# Patient Record
Sex: FEMALE | Race: WHITE
[De-identification: names, ages, dates, MRNs, and addresses within clinical notes are randomized per-mention and may not be internally consistent; named-entity substitution may affect disease eponyms.]

---

## 2017-06-04 ENCOUNTER — HOSPITAL ENCOUNTER (EMERGENCY)
Dept: HOSPITAL 10 - E/R | Age: 37
LOS: 1 days | Discharge: HOME | End: 2017-06-05
Payer: COMMERCIAL

## 2017-06-04 VITALS
HEIGHT: 61 IN | WEIGHT: 194.01 LBS | HEIGHT: 61 IN | BODY MASS INDEX: 36.63 KG/M2 | WEIGHT: 194.01 LBS | BODY MASS INDEX: 36.63 KG/M2

## 2017-06-04 DIAGNOSIS — I10: ICD-10-CM

## 2017-06-04 DIAGNOSIS — N93.9: Primary | ICD-10-CM

## 2017-06-04 DIAGNOSIS — R10.2: ICD-10-CM

## 2017-06-04 LAB
ADD SCAN DIFF: NO
ANION GAP SERPL CALC-SCNC: 14 MMOL/L (ref 8–16)
APTT BLD: 29.2 SEC (ref 25–35)
BASOPHILS # BLD AUTO: 0 10^3/UL (ref 0–0.1)
BASOPHILS NFR BLD: 0.2 % (ref 0–2)
BUN SERPL-MCNC: 12 MG/DL (ref 7–20)
CALCIUM SERPL-MCNC: 8.8 MG/DL (ref 8.4–10.2)
CHLORIDE SERPL-SCNC: 107 MMOL/L (ref 97–110)
CO2 SERPL-SCNC: 25 MMOL/L (ref 21–31)
CREAT SERPL-MCNC: 0.65 MG/DL (ref 0.44–1)
EOSINOPHIL # BLD: 0.1 10^3/UL (ref 0–0.5)
EOSINOPHIL NFR BLD: 1.4 % (ref 0–7)
ERYTHROCYTE [DISTWIDTH] IN BLOOD BY AUTOMATED COUNT: 14 % (ref 11.5–14.5)
GLUCOSE SERPL-MCNC: 153 MG/DL (ref 70–220)
HCT VFR BLD CALC: 31.4 % (ref 37–47)
HGB BLD-MCNC: 10.1 G/DL (ref 12–16)
INR PPP: 0.99
LYMPHOCYTES # BLD AUTO: 2.6 10^3/UL (ref 0.8–2.9)
LYMPHOCYTES NFR BLD AUTO: 26.3 % (ref 15–51)
MCH RBC QN AUTO: 25.8 PG (ref 29–33)
MCHC RBC AUTO-ENTMCNC: 32.2 G/DL (ref 32–37)
MCV RBC AUTO: 80.1 FL (ref 82–101)
MONOCYTES # BLD: 0.5 10^3/UL (ref 0.3–0.9)
MONOCYTES NFR BLD: 4.8 % (ref 0–11)
NEUTROPHILS # BLD: 6.7 10^3/UL (ref 1.6–7.5)
NEUTROPHILS NFR BLD AUTO: 66.9 % (ref 39–77)
NRBC # BLD MANUAL: 0 10^3/UL (ref 0–0)
NRBC BLD QL: 0 /100WBC (ref 0–0)
PLATELET # BLD: 318 10^3/UL (ref 140–415)
PMV BLD AUTO: 9.3 FL (ref 7.4–10.4)
POTASSIUM SERPL-SCNC: 4.2 MMOL/L (ref 3.5–5.1)
PROTHROMBIN TIME: 13.1 SEC (ref 12.2–14.2)
PT RATIO: 1
RBC # BLD AUTO: 3.92 10^6/UL (ref 4.2–5.4)
SODIUM SERPL-SCNC: 142 MMOL/L (ref 135–144)
WBC # BLD AUTO: 10 10^3/UL (ref 4.8–10.8)

## 2017-06-04 PROCEDURE — 84703 CHORIONIC GONADOTROPIN ASSAY: CPT

## 2017-06-04 PROCEDURE — 36415 COLL VENOUS BLD VENIPUNCTURE: CPT

## 2017-06-04 PROCEDURE — 80048 BASIC METABOLIC PNL TOTAL CA: CPT

## 2017-06-04 PROCEDURE — 85610 PROTHROMBIN TIME: CPT

## 2017-06-04 PROCEDURE — 85730 THROMBOPLASTIN TIME PARTIAL: CPT

## 2017-06-04 PROCEDURE — 85025 COMPLETE CBC W/AUTO DIFF WBC: CPT

## 2017-06-04 PROCEDURE — 76856 US EXAM PELVIC COMPLETE: CPT

## 2017-06-04 PROCEDURE — 76830 TRANSVAGINAL US NON-OB: CPT

## 2017-06-05 VITALS
DIASTOLIC BLOOD PRESSURE: 97 MMHG | SYSTOLIC BLOOD PRESSURE: 149 MMHG | HEART RATE: 93 BPM | TEMPERATURE: 97.9 F | RESPIRATION RATE: 20 BRPM

## 2017-06-05 NOTE — RADRPT
PROCEDURE:   US Pelvis. 

 

CLINICAL INDICATION:   Vaginal bleeding

 

TECHNIQUE:   Multiple sonographic images of the pelvis were obtained utilizing a transabdominal and 
endovaginal technique.   The images were reviewed on a PACS workstation. 

 

COMPARISON:   None available 

 

FINDINGS:

 

Uterus: Normal in size, contour and echogenicity with no evidence for myometrial masses. Size is est
imated at 7 x 5.9 x 4.4 cm.

 

Cervix:  No abnormalities of significance are seen.

 

Endometrium:  Increased in thickness; 15.6 mm. Normal blood flow on Doppler interrogation, the findi
ngs likely related to the patients phase of menstruation

 

Right ovary / adnexa:  Normal in size estimated at 3.1 x 2.4 x 2.2 cm.  No evidence for masses, norm
al blood flow on Doppler interrogation.

 

Left ovary/adnexa: Normal in size estimated at 4.6 x 3.8 x 3.4 cm.  No evidence for solid masses, no
rmal blood flow on Doppler interrogation. Simple anechoic cyst is present estimated at 3.4 x 3.1 x 2
.6 cm

 

Cul-de-sac:  No evidence of free fluid.  

 

 

RPTAT:HJJR

 

IMPRESSION:

 

1.  Endometrial thickness of 15.6 mm likely is related to the patients phase of menstruation.  No en
dometrial or myometrial mass identified.

2.  Simple anechoic left ovarian cyst of 3.4 cm.

_____________________________________________ 

Physician Alea           Date    Time 

Electronically viewed and signed by Physician Alea on 06/05/2017 00:41 

 

D:  06/05/2017 00:41  T:  06/05/2017 00:41

/

## 2017-06-05 NOTE — ERA
ER Documentation


Chief Complaint


Date/Time


DATE: 17 


TIME: 00:24


Chief Complaint


VAG BLEED X 28 DAYS WITH VERY HEAVY BLEEDING X 2-3 DAYS





HPI


This is a 37-year-old female heavy vaginal bleeding for 28 days.  She is a 

vitamin for the past 2-3 days.  No nausea no vomiting fevers or chills.  No 

other current complaints.  Patient has not seen her OB.





ROS


All systems reviewed and are negative except as per history of present illness.





Medications


Home Meds


Active Scripts


Ibuprofen* (Ibuprofen*) 600 Mg Tablet, 600 MG PO TID for PAIN AND/OR 

INFLAMMATION, #30 TAB


   Prov:JOSUÉ LLANOS MD         16


Prednisone* (Prednisone*) 20 Mg Tab, 20 MG PO BID for 3 Days, TAB


   Prov:JOSUÉ LLANOS MD         16





Allergies


Allergies:  


Coded Allergies:  


     No Known Allergy (Unverified , 16)





PMhx/Soc


History of Surgery:  Yes (RT OVARIAN CYST, APPY, TONSILLECTOMY )


Anesthesia Reaction:  No


Hx Neurological Disorder:  No


Hx Respiratory Disorders:  No


Hx Cardiac Disorders:  Yes (HTN )


Hx Psychiatric Problems:  No


Hx Miscellaneous Medical Probl:  No


Hx Alcohol Use:  No


Hx Substance Use:  No


Hx Tobacco Use:  No


Smoking Status:  Never smoker





Physical Exam


Vitals





Vital Signs








  Date Time  Temp Pulse Resp B/P Pulse Ox O2 Delivery O2 Flow Rate FiO2


 


17 23:29  95 19 149/97 100 Room Air  


 


17 21:54 98.0 79 18 174/102 100 Room Air  


 


17 20:59 97.3 102 18 182/106 98   








Physical Exam


Const:  []


Head:   Atraumatic 


Eyes:    Normal Conjunctiva


ENT:    Normal External Ears, Nose and Mouth.


Neck:               Full range of motion..~ No meningismus.


Resp:    Clear to auscultation bilaterally


Cardio:    Regular rate and rhythm, no murmurs


Abd:    Soft, non tender, non distended. Normal bowel sounds


Skin:    No petechiae or rashes


Back:    No midline or flank tenderness


Ext:    No cyanosis, or edema


Neur:    Awake and alert


Psych:    Normal Mood and Affect


Result Diagram:  


17





Results 24 hrs





 Laboratory Tests








Test


  17


21:48


 


White Blood Count 10.010^3/ul 


 


Red Blood Count 3.9210^6/ul 


 


Hemoglobin 10.1g/dl 


 


Hematocrit 31.4% 


 


Mean Corpuscular Volume 80.1fl 


 


Mean Corpuscular Hemoglobin 25.8pg 


 


Mean Corpuscular Hemoglobin


Concent 32.2g/dl 


 


 


Red Cell Distribution Width 14.0% 


 


Platelet Count 23851^3/UL 


 


Mean Platelet Volume 9.3fl 


 


Neutrophils % 66.9% 


 


Lymphocytes % 26.3% 


 


Monocytes % 4.8% 


 


Eosinophils % 1.4% 


 


Basophils % 0.2% 


 


Nucleated Red Blood Cells % 0.0/100WBC 


 


Neutrophils # 6.710^3/ul 


 


Lymphocytes # 2.610^3/ul 


 


Monocytes # 0.510^3/ul 


 


Eosinophils # 0.110^3/ul 


 


Basophils # 0.010^3/ul 


 


Nucleated Red Blood Cells # 0.010^3/ul 


 


Prothrombin Time 13.1Sec 


 


Prothrombin Time Ratio 1.0 


 


INR International Normalized


Ratio 0.99 


 


 


Activated Partial


Thromboplast Time 29.2Sec 


 


 


Sodium Level 142mmol/L 


 


Potassium Level 4.2mmol/L 


 


Chloride Level 107mmol/L 


 


Carbon Dioxide Level 25mmol/L 


 


Anion Gap 14 


 


Blood Urea Nitrogen 12mg/dl 


 


Creatinine 0.65mg/dl 


 


Glucose Level 153mg/dl 


 


Calcium Level 8.8mg/dl 


 


Serum HCG, Qualitative NEGATIVE 











Procedures/MDM


Ultrasound shows thickened endometrium.





Medical decision-m[age]-year-old female with vaginal bleeding.  Patient was 

placed on OCPs and told to follow-up with OB/GYN.  No evidence of anemia at 

this time.  Patient to return for worsening symptoms.





Departure


Diagnosis:  


 Primary Impression:  


 Vaginal bleeding


Condition:  Stable











SCOTT BRADY 2017 00:24

## 2018-08-13 ENCOUNTER — HOSPITAL ENCOUNTER (EMERGENCY)
Dept: HOSPITAL 91 - E/R | Age: 38
Discharge: HOME | End: 2018-08-13
Payer: COMMERCIAL

## 2018-08-13 ENCOUNTER — HOSPITAL ENCOUNTER (EMERGENCY)
Age: 38
Discharge: HOME | End: 2018-08-13

## 2018-08-13 DIAGNOSIS — R40.2142: ICD-10-CM

## 2018-08-13 DIAGNOSIS — I10: Primary | ICD-10-CM

## 2018-08-13 DIAGNOSIS — R40.2252: ICD-10-CM

## 2018-08-13 DIAGNOSIS — R40.2362: ICD-10-CM

## 2018-08-13 PROCEDURE — 99283 EMERGENCY DEPT VISIT LOW MDM: CPT

## 2018-08-28 ENCOUNTER — HOSPITAL ENCOUNTER (EMERGENCY)
Age: 38
Discharge: HOME | End: 2018-08-28

## 2018-08-28 ENCOUNTER — HOSPITAL ENCOUNTER (EMERGENCY)
Dept: HOSPITAL 91 - FTE | Age: 38
Discharge: HOME | End: 2018-08-28
Payer: COMMERCIAL

## 2018-08-28 DIAGNOSIS — I10: ICD-10-CM

## 2018-08-28 DIAGNOSIS — L08.9: ICD-10-CM
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
## 2018-08-28 DIAGNOSIS — S40.862A: Primary | ICD-10-CM
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
## 2018-08-28 PROCEDURE — 99284 EMERGENCY DEPT VISIT MOD MDM: CPT

## 2023-04-14 ENCOUNTER — HOSPITAL ENCOUNTER (EMERGENCY)
Dept: HOSPITAL 54 - ER | Age: 43
Discharge: HOME | End: 2023-04-14
Payer: COMMERCIAL

## 2023-04-14 VITALS — HEIGHT: 61 IN | WEIGHT: 188 LBS | BODY MASS INDEX: 35.5 KG/M2

## 2023-04-14 VITALS — DIASTOLIC BLOOD PRESSURE: 97 MMHG | SYSTOLIC BLOOD PRESSURE: 146 MMHG

## 2023-04-14 DIAGNOSIS — I10: ICD-10-CM

## 2023-04-14 DIAGNOSIS — Z79.899: ICD-10-CM

## 2023-04-14 DIAGNOSIS — Z90.49: ICD-10-CM

## 2023-04-14 DIAGNOSIS — N39.0: Primary | ICD-10-CM

## 2023-04-14 LAB
BILIRUB UR QL STRIP: NEGATIVE
COLOR UR: YELLOW
DEPRECATED SQUAMOUS URNS QL MICRO: (no result) /HPF
GLUCOSE UR STRIP-MCNC: NEGATIVE MG/DL
LEUKOCYTE ESTERASE UR QL STRIP: (no result)
NITRITE UR QL STRIP: POSITIVE
PH UR STRIP: 6.5 [PH] (ref 5–8)
PROT UR QL STRIP: (no result) MG/DL
RBC #/AREA URNS HPF: (no result) /HPF (ref 0–2)
UROBILINOGEN UR STRIP-MCNC: 0.2 EU/DL
WBC #/AREA URNS HPF: (no result) /HPF
WBC #/AREA URNS HPF: (no result) /HPF (ref 0–3)